# Patient Record
Sex: FEMALE | Race: WHITE | NOT HISPANIC OR LATINO | URBAN - METROPOLITAN AREA
[De-identification: names, ages, dates, MRNs, and addresses within clinical notes are randomized per-mention and may not be internally consistent; named-entity substitution may affect disease eponyms.]

---

## 2018-03-26 ENCOUNTER — RECORDS - HEALTHEAST (OUTPATIENT)
Dept: LAB | Facility: CLINIC | Age: 38
End: 2018-03-26

## 2018-03-26 LAB
ABO/RH(D): NORMAL
ABORH REPEAT: NORMAL
HCG SERPL-ACNC: 1490 MLU/ML (ref 0–4)

## 2018-03-28 ENCOUNTER — RECORDS - HEALTHEAST (OUTPATIENT)
Dept: LAB | Facility: CLINIC | Age: 38
End: 2018-03-28

## 2018-03-28 LAB — HCG SERPL-ACNC: 448 MLU/ML (ref 0–4)

## 2018-03-30 ENCOUNTER — RECORDS - HEALTHEAST (OUTPATIENT)
Dept: LAB | Facility: CLINIC | Age: 38
End: 2018-03-30

## 2018-03-30 LAB — HCG SERPL-ACNC: 199 MLU/ML (ref 0–4)

## 2020-07-21 ENCOUNTER — OFFICE VISIT - HEALTHEAST (OUTPATIENT)
Dept: SURGERY | Facility: CLINIC | Age: 40
End: 2020-07-21

## 2020-07-21 DIAGNOSIS — K80.20 CALCULUS OF GALLBLADDER WITHOUT CHOLECYSTITIS WITHOUT OBSTRUCTION: ICD-10-CM

## 2020-07-27 ENCOUNTER — COMMUNICATION - HEALTHEAST (OUTPATIENT)
Dept: SURGERY | Facility: CLINIC | Age: 40
End: 2020-07-27

## 2020-07-27 ENCOUNTER — AMBULATORY - HEALTHEAST (OUTPATIENT)
Dept: SURGERY | Facility: AMBULATORY SURGERY CENTER | Age: 40
End: 2020-07-27

## 2020-07-27 DIAGNOSIS — Z11.59 ENCOUNTER FOR SCREENING FOR OTHER VIRAL DISEASES: ICD-10-CM

## 2020-08-28 ASSESSMENT — MIFFLIN-ST. JEOR: SCORE: 1403.9

## 2020-08-31 ENCOUNTER — AMBULATORY - HEALTHEAST (OUTPATIENT)
Dept: FAMILY MEDICINE | Facility: CLINIC | Age: 40
End: 2020-08-31

## 2020-08-31 DIAGNOSIS — Z11.59 ENCOUNTER FOR SCREENING FOR OTHER VIRAL DISEASES: ICD-10-CM

## 2020-09-01 ENCOUNTER — ANESTHESIA - HEALTHEAST (OUTPATIENT)
Dept: SURGERY | Facility: AMBULATORY SURGERY CENTER | Age: 40
End: 2020-09-01

## 2020-09-02 ENCOUNTER — SURGERY - HEALTHEAST (OUTPATIENT)
Dept: SURGERY | Facility: AMBULATORY SURGERY CENTER | Age: 40
End: 2020-09-02

## 2020-09-02 ENCOUNTER — COMMUNICATION - HEALTHEAST (OUTPATIENT)
Dept: SCHEDULING | Facility: CLINIC | Age: 40
End: 2020-09-02

## 2020-09-02 ASSESSMENT — MIFFLIN-ST. JEOR: SCORE: 1403.9

## 2020-09-28 ENCOUNTER — COMMUNICATION - HEALTHEAST (OUTPATIENT)
Dept: ADMINISTRATIVE | Facility: CLINIC | Age: 40
End: 2020-09-28

## 2021-05-26 ENCOUNTER — RECORDS - HEALTHEAST (OUTPATIENT)
Dept: ADMINISTRATIVE | Facility: CLINIC | Age: 41
End: 2021-05-26

## 2021-05-27 ENCOUNTER — RECORDS - HEALTHEAST (OUTPATIENT)
Dept: ADMINISTRATIVE | Facility: CLINIC | Age: 41
End: 2021-05-27

## 2021-06-04 VITALS — WEIGHT: 164 LBS | BODY MASS INDEX: 28 KG/M2 | HEIGHT: 64 IN

## 2021-06-09 NOTE — PROGRESS NOTES
"Viji Petty is a 39 y.o. female who is being evaluated via a billable video visit.      The patient has been notified of following:     \"This video visit will be conducted via a call between you and your physician/provider. We have found that certain health care needs can be provided without the need for an in-person physical exam.  This service lets us provide the care you need with a video conversation.  If a prescription is necessary we can send it directly to your pharmacy.  If lab work is needed we can place an order for that and you can then stop by our lab to have the test done at a later time.    Video visits are billed at different rates depending on your insurance coverage. Please reach out to your insurance provider with any questions.    If during the course of the call the physician/provider feels a video visit is not appropriate, you will not be charged for this service.\"    Patient has given verbal consent to a Video visit? Yes  How would you like to obtain your AVS? AVS Preference: MyChart.  If dropped by the video visit, the video invitation should be sent to: Text to cell phone: [phone]  Will anyone else be joining your video visit? No        Video Start Time: 9:36 AM    Additional provider notes: Asked to evaluate Viji for cholelithiasis.  The patient had a one-time episode of severe abdominal pain that brought her to the emergency room.  The pain resolved while she was in the emergency room and she was discharged home.  She has had no pain since then.  She does not recall even any minor episodes up until that time.    Past medical history:  No medical problems.  No previous surgeries.  No medications.    No known medical allergies.    Social History     Tobacco Use     Smoking status: Not on file   Substance Use Topics     Alcohol use: Not on file     Drug use: Not on file           Impression: Cholelithiasis with biliary colic.    Plan: Laparoscopic cholecystectomy.  Risk and " benefits of surgery, including bleeding, infection and common bile duct injury were explained and the patient agrees to proceed.      Video-Visit Details    Type of service:  Video Visit    Video End Time (time video stopped): 9:51 AM  Originating Location (pt. Location): Home    Distant Location (provider location):  [facility]     Platform used for Video Visit: Ivett Gonzalez MD

## 2021-06-11 NOTE — ANESTHESIA POSTPROCEDURE EVALUATION
Patient: iVji Petty  Procedure(s):  CHOLECYSTECTOMY, LAPAROSCOPIC  Anesthesia type: general    Patient location: Phase II Recovery  Last vitals:   Vitals Value Taken Time   /70 9/2/2020 12:06 PM   Temp 36.3  C (97.4  F) 9/2/2020 11:20 AM   Pulse 72 9/2/2020 12:07 PM   Resp 16 9/2/2020 12:00 PM   SpO2 98 % 9/2/2020 12:07 PM   Vitals shown include unvalidated device data.  Post vital signs: stable  Level of consciousness: awake and responds to simple questions  Post-anesthesia pain: pain controlled  Post-anesthesia nausea and vomiting: no  Pulmonary: unassisted, return to baseline  Cardiovascular: stable and blood pressure at baseline  Hydration: adequate  Anesthetic events: no    QCDR Measures:  ASA# 11 - Sosa-op Cardiac Arrest: ASA11B - Patient did NOT experience unanticipated cardiac arrest  ASA# 12 - Sosa-op Mortality Rate: ASA12B - Patient did NOT die  ASA# 13 - PACU Re-Intubation Rate: ASA13B - Patient did NOT require a new airway mgmt  ASA# 10 - Composite Anes Safety: ASA10A - No serious adverse event    Additional Notes:

## 2021-06-11 NOTE — TELEPHONE ENCOUNTER
Pt had a Telephone Post Op visit scheduled  with Gen Surg Nurse on 9/17. She said she never received a call from anyone. Please call pt back at [phone]

## 2021-06-11 NOTE — ANESTHESIA PREPROCEDURE EVALUATION
Anesthesia Evaluation      Patient summary reviewed   No history of anesthetic complications     Airway   Mallampati: II  Neck ROM: full   Pulmonary - negative ROS and normal exam   (+) a smoker                         Cardiovascular - negative ROS and normal exam   Neuro/Psych - negative ROS     Endo/Other - negative ROS      GI/Hepatic/Renal - negative ROS     Comments: cholelithiasis          Dental - normal exam     Comment: Numerous fillings                       Anesthesia Plan  Planned anesthetic: general endotracheal and total IV anesthesia  Versed/fentanyl/propofol/ofelia  Propofol infusion  Ketamine PRN  Decadron/zofran/scop      ASA 2   Induction: intravenous   Anesthetic plan and risks discussed with: patient and parent/guardian  Anesthesia plan special considerations: antiemetics,   Post-op plan: routine recovery      covid pcr negative 8/31/2020    Results for orders placed or performed during the hospital encounter of 09/02/20   POCT Pregnancy (Beta-hCG, Qual), Urine (Point of Care) on DOS   Result Value Ref Range    POC Preg, Urine Negative Negative    POCT Kit Lot Number [id number]     POCT Kit Expiration Date 1/22     Pos Control Valid Control Valid Control    Neg Control Valid Control Valid Control    Dipstick Lot Number      Dipstick Expiration Date      POC Specific Gravity, Urine

## 2021-06-11 NOTE — TELEPHONE ENCOUNTER
Post-Op Phone Call               Surgeon: Dr. Carlos M.D..    Date of Surgery: 9/2/2020  Discharge Date: 9/2/2020    Date/Time Called:   Date: 9/28/2020 Time: 11:55 AM   Attempt: First    Notes:  Doing well. No pain. Completely healed.      Return to Work Plans?  Expected date: Back to work  Do you need anything from us in this regard? No     Post-op appointment made? No        Thank you for your time. Please do not hesitate to call us with any questions or concerns.    Call completed by: Renetta Davis

## 2021-06-11 NOTE — ANESTHESIA CARE TRANSFER NOTE
Last vitals:   Vitals:    09/02/20 1015   BP: 141/65   Pulse: 100   Resp: 22   Temp: 36.6  C (97.9  F)   SpO2: 98%     Patient spontaneous RR, TV 300s, suctioned, following commands, extubated to facemask 10LPM, O2 sats 100%. VSS. Report to RN.    Patient's level of consciousness is drowsy  Spontaneous respirations: yes  Maintains airway independently: yes  Dentition unchanged: yes  Oropharynx: oropharynx clear of all foreign objects    QCDR Measures:  ASA# 20 - Surgical Safety Checklist: WHO surgical safety checklist completed prior to induction    PQRS# 430 - Adult PONV Prevention: 4558F - Pt received => 2 anti-emetic agents (different classes) preop & intraop  ASA# 8 - Peds PONV Prevention: NA - Not pediatric patient, not GA or 2 or more risk factors NOT present  PQRS# 424 - Sosa-op Temp Management: 4559F - At least one body temp DOCUMENTED => 35.5C or 95.9F within required timeframe  PQRS# 426 - PACU Transfer Protocol: - Transfer of care checklist used  ASA# 14 - Acute Post-op Pain: ASA14B - Patient did NOT experience pain >= 7 out of 10

## 2021-06-16 PROBLEM — K80.20 CALCULUS OF GALLBLADDER WITHOUT CHOLECYSTITIS WITHOUT OBSTRUCTION: Status: ACTIVE | Noted: 2020-07-27

## 2021-06-26 ENCOUNTER — HEALTH MAINTENANCE LETTER (OUTPATIENT)
Age: 41
End: 2021-06-26

## 2021-10-16 ENCOUNTER — HEALTH MAINTENANCE LETTER (OUTPATIENT)
Age: 41
End: 2021-10-16

## 2022-07-23 ENCOUNTER — HEALTH MAINTENANCE LETTER (OUTPATIENT)
Age: 42
End: 2022-07-23

## 2022-10-01 ENCOUNTER — HEALTH MAINTENANCE LETTER (OUTPATIENT)
Age: 42
End: 2022-10-01

## 2023-08-06 ENCOUNTER — HEALTH MAINTENANCE LETTER (OUTPATIENT)
Age: 43
End: 2023-08-06

## 2024-03-03 ENCOUNTER — HEALTH MAINTENANCE LETTER (OUTPATIENT)
Age: 44
End: 2024-03-03